# Patient Record
Sex: MALE | Race: WHITE
[De-identification: names, ages, dates, MRNs, and addresses within clinical notes are randomized per-mention and may not be internally consistent; named-entity substitution may affect disease eponyms.]

---

## 2019-10-11 ENCOUNTER — HOSPITAL ENCOUNTER (OUTPATIENT)
Dept: HOSPITAL 92 - SDC | Age: 24
Setting detail: OBSERVATION
LOS: 1 days | Discharge: HOME | End: 2019-10-12
Attending: ORTHOPAEDIC SURGERY | Admitting: ORTHOPAEDIC SURGERY
Payer: COMMERCIAL
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
## 2019-10-11 DIAGNOSIS — M23.42: ICD-10-CM
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
## 2019-10-11 DIAGNOSIS — M23.92: Primary | ICD-10-CM

## 2019-10-11 DIAGNOSIS — G47.33: ICD-10-CM

## 2019-10-11 PROCEDURE — 90471 IMMUNIZATION ADMIN: CPT

## 2019-10-11 PROCEDURE — 0SCD4ZZ EXTIRPATION OF MATTER FROM LEFT KNEE JOINT, PERCUTANEOUS ENDOSCOPIC APPROACH: ICD-10-PCS | Performed by: ORTHOPAEDIC SURGERY

## 2019-10-11 PROCEDURE — A4306 DRUG DELIVERY SYSTEM <=50 ML: HCPCS

## 2019-10-11 PROCEDURE — 96376 TX/PRO/DX INJ SAME DRUG ADON: CPT

## 2019-10-11 PROCEDURE — G0008 ADMIN INFLUENZA VIRUS VAC: HCPCS

## 2019-10-11 PROCEDURE — 96375 TX/PRO/DX INJ NEW DRUG ADDON: CPT

## 2019-10-11 PROCEDURE — G0378 HOSPITAL OBSERVATION PER HR: HCPCS

## 2019-10-11 PROCEDURE — 0MRP47Z REPLACEMENT OF LEFT KNEE BURSA AND LIGAMENT WITH AUTOLOGOUS TISSUE SUBSTITUTE, PERCUTANEOUS ENDOSCOPIC APPROACH: ICD-10-PCS | Performed by: ORTHOPAEDIC SURGERY

## 2019-10-11 PROCEDURE — 96365 THER/PROPH/DIAG IV INF INIT: CPT

## 2019-10-11 PROCEDURE — 96361 HYDRATE IV INFUSION ADD-ON: CPT

## 2019-10-11 PROCEDURE — 96366 THER/PROPH/DIAG IV INF ADDON: CPT

## 2019-10-11 PROCEDURE — 90686 IIV4 VACC NO PRSV 0.5 ML IM: CPT

## 2019-10-11 PROCEDURE — 96367 TX/PROPH/DG ADDL SEQ IV INF: CPT

## 2019-10-11 PROCEDURE — C1713 ANCHOR/SCREW BN/BN,TIS/BN: HCPCS

## 2019-10-11 RX ADMIN — CEFAZOLIN SODIUM SCH MLS: 2 SOLUTION INTRAVENOUS at 23:54

## 2019-10-11 RX ADMIN — VANCOMYCIN HYDROCHLORIDE SCH MLS: 1 INJECTION, SOLUTION INTRAVENOUS at 22:13

## 2019-10-11 RX ADMIN — CEFAZOLIN SODIUM SCH MLS: 2 SOLUTION INTRAVENOUS at 15:02

## 2019-10-11 NOTE — OP
DATE OF PROCEDURE:  10/11/2019



PREOPERATIVE DIAGNOSIS:  Left knee failed revision anterior cruciate ligament

reconstruction. 



POSTOPERATIVE DIAGNOSES:  

1. Left knee failed revision anterior cruciate ligament reconstruction.

2. Grade 4 lesion, medial femoral condyle that measures as follows- the most

anterior portion of the lesion is 1.5 cm from medial to lateral,  the most 
posterior

portion of the lesion is 0.5 cm from medial to lateral,  the lesion length is 
2.5 cm. 

3. Large cartilaginous loose body greater than 1 cm.



PROCEDURES PERFORMED:  

1. Left knee exam under anesthesia.

2. Left knee arthroscopy with arthroscopically assisted anterior cruciate 
ligament

reconstruction using an allograft Achilles tendon. 

3. Removal of loose body, greater than 1 cm.



ASSISTANT:  Zackery Michelle PA-C.



BLOOD LOSS:  Minimal.



COMPLICATIONS:  None.



ANESTHESIA:  He did have a general anesthetic as well as a preoperative block.



IMPLANTS:  7 x 25 metal interference screw on the femur.  We used a 9 x 20

[employer] interference screw in tibial tunnel, backed up by a bicortical 
screw

and the soft tissue washer for our final fixation.  All those devices are 
[employer]

devices and he did go to recovery in stable condition. 



INDICATIONS:  This is a 24-year-old male, who now works as a , has had 2

previous ACL reconstructions.  Unfortunately, his knee gave way and he had a

significant pain and swelling in the knee.  An MRI was performed to confirm ACL 
tear

as well as what appeared to be a grade 4 lesion on medial femoral condyle.  
Options

were discussed with the patient and at this time, it was decided to proceed with

allograft reconstruction. 



DESCRIPTION OF PROCEDURE:  After all appropriate consent forms were explained 
and

signed, he was taken back to the operative room and at this time was given 
general

anesthetic.  Once the level of anesthesia was appropriate, an exam under 
anesthesia

confirmed a positive Lachman exam and at this time, a tourniquet was placed on 
the

left thigh.  The leg was then placed in arthroscopic leg walters and was then 
prepped

and draped in standard surgical fashion.  At this time, the limb was 
exsanguinated

and the tourniquet was taken to 300 mmHg.  Inferolateral portal was established
, and

the scope was placed into the knee joint.  A needle localization technique was 
then

used to make a medial working portal.  Diagnostic arthroscopy commenced in the

notch.  There was no ACL left.  There were some small osteophytes noted.  These 
were

taken down off the tibia.  The PCL had some synovial overgrowth, and this was 
also

debrided to debulk the PCL because it was taken up space in the notch.  We took 
it

down to normal fibers and that was it.  Evaluation of the medial compartment 
showed

the patient to have a large grade 4 lesion on his femur.  There were some 
unstable

chondral flaps.  These were debrided.  There were some small pieces of cartilage

floating.  These were sucked into the shaver device.  Once we got good walls, 
this

was measured.  The most posterior aspect was found to be 5 mm wide.  The most

anterior aspect was found to be 15 mm wide and the length of the lesion was 2.5 
cm.

This was left alone as was felt to be too bigger lesion to try and do drill 
holes

and the patient did not want at this time to have any type of cartilage

reconstruction secondary to having to be on crutches for prolonged period of 
time.

The medial meniscus was noted to have had a significant partial medial 
meniscectomy.

 There were no new tears noted.  We went to the medial gutter and there, we 
found a

large piece of cartilage that was greater than a centimeter and this was removed

with a grasper.  Once this was done, we evaluated patellofemoral joint and was 
found

to be in good condition.  Lateral gutters were clear and the lateral 
compartment was

found to be intact with what appeared to be some scar tissue in the lateral

meniscus.  On the periphery, it was probed.  It was felt to be intact, but this 
was

from the previous repair that was done years ago.  Again, no new tearing was 
noted.

There was no significant cartilage deformity on the lateral side either.  At 
this

time, a significant notchplasty was performed to open up the notch, so there 
would

be no impingement on the PCL.  This was done with a lele and a shaver.  Once we 
had

good visualization of our entire femur, we were able to reach around the back.  
We

had good bony bridge posteriorly and there was no obvious large cystic hole 
from the

previous femoral tunnels, but it was felt the femoral tunnel was a little bit 
more

in a vertical position than what a light.  Once this was done, the tibial side 
was

cleaned off any soft tissue and at this time, we went about doing our

reconstruction.  The graft had been thawed and prepared on the back table.  The 
bone

plug was 20 mm in length and a size 10 in diameter; therefore, we took our

over-the-top guide, flexing the knee up.  We placed the guide through the medial

portal and placed the guide pin up and out the anterolateral thigh.  We first 
reamed

with a 7 mm reamer to a depth of 30, followed by the 10 mm reamer to a depth of 
30,

making sure that when we went through hard previously used bone, we had a nice

tunnel.  At this time, all loose bony and cartilage debris were removed from the

knee joint with the suction shaver device.  Our dilator was then used to 
compact our

tunnel.  Again, this was felt to be a very nice tunnel with a nice posterior 
wall

and no compromise whatsoever.  Tibial guide was then placed into the knee at 55

degrees.  Pin was placed into the knee joint.  The 10-mm reamer was then used to

ream our tibial tunnel.  Again, all edges were smoothed off with a rasp and a 
lele,

and any soft tissue and loose bone were removed from the knee joint at this 
time.

Once this was done, we went dry.  We flexed the knee up again.  We placed a pin 
up

and out the anterolateral thigh using this to pull our passing suture into the 
knee

joint.  This was pulled down the tibial tunnel and used to pull our graft up 
into

the knee.  We had nice snug graft in our 10 mm hole and fixated with a 7 x 25 
metal

interference screw.  This gave us excellent fixation.  We then took the knee 
through

full range of motion, making sure that the ACL was not impinged on the PCL or 
the

femur.  Once this was done, we removed the scope.  We then went down to our 
tibial

tunnel.  We then placed our guide pin and placed our 9 x 20 [employer]

interference screw up into the tibial tunnel and once this was done, we 
dissected

the camera back in to make sure that no screw was visible.  We then drilled and

tapped a bicortical tunnel.  We then placed a bicortical screw with a soft 
tissue

washer, compressing the Achilles tendon to the tibia as backup fixation.  Once 
this

was done, our scope was placed into the knee, one last time going through full 
range

of motion, making sure we had no impingement and full range of motion.  Once 
this

was done, camera was removed.  Knee was drained. 

Excess tendon was removed and at this time, deep Vicryl, 2-0 Vicryl, and nylon

sutures were then used to close the skin.  Nylon sutures were then used to 
close our

portals.  A bulky sterile dressing was applied.  Tourniquet was let down.  Toes

pinked up nicely.  The patient was awaken and taken to recovery room in stable

condition.  All counts were correct at the end of the case and he did receive

preoperative IV antibiotics. 





Job ID:  [id number]



MTDD

## 2019-10-12 VITALS — TEMPERATURE: 98.2 F

## 2019-10-12 VITALS — DIASTOLIC BLOOD PRESSURE: 71 MMHG | SYSTOLIC BLOOD PRESSURE: 109 MMHG

## 2019-10-12 RX ADMIN — VANCOMYCIN HYDROCHLORIDE SCH MLS: 1 INJECTION, SOLUTION INTRAVENOUS at 09:37

## 2019-10-12 NOTE — DIS
DATE OF ADMISSION:  10/11/2019



DATE OF DISCHARGE:  10/12/2019



ADMITTING DIAGNOSES:  Failed left knee anterior cruciate ligament reconstruction and

left knee medial femoral condyle osteochondral defect. 



DISCHARGE DIAGNOSES:  Failed left knee anterior cruciate ligament reconstruction and

left knee medial femoral condyle osteochondral defect. 



PROCEDURE PERFORMED:  Revision of anterior cruciate ligament reconstruction

utilizing allograft. 



CONSULTANTS:  [facility].



BRIEF CLINICAL HISTORY:  Jeff is a 24-year-old male, who was admitted to [facility], who underwent the above elective procedure on date

of admission without intra, jasmina, or postop complication.  His hospital course was

unremarkable.  At the time of discharge, the patient is afebrile.  He is ambulatory

in a nonweightbearing fashion with crutches, tolerating regular diet and voiding

without difficulty.  His incision is clean and closed without any erythema. 



DISCHARGE MEDICATIONS:  Include Norco for pain. 



We will be happy to see the patient on an as-needed basis between now and his next

scheduled appointment. 



CONDITION ON DISCHARGE:  Stable.



PROGNOSIS:  Good.







Job ID:  [id number]